# Patient Record
(demographics unavailable — no encounter records)

---

## 2025-07-01 NOTE — REASON FOR VISIT
[Arrhythmia/ECG Abnorrmalities] : arrhythmia/ECG abnormalities [FreeTextEntry3] : Dr. Daniel Mustafa

## 2025-07-01 NOTE — DISCUSSION/SUMMARY
[EKG obtained to assist in diagnosis and management of assessed problem(s)] : EKG obtained to assist in diagnosis and management of assessed problem(s) [FreeTextEntry1] : Impression:  1. Sinus node dysfunction, non-sustained SVT:  EKG performed today to assess for presence of conduction disease, pre-excitation or atrial fibrillation reveals sinus rhythm.  Holter with low heart rate/bradycardia at 32 while awake. The rationale for device placement as well as the procedural risks--including but not limited to pocket hematoma, infection, pneumothorax, pericardial bleed/tamponade, lead dislodgement, and death--were reviewed in detail. After consideration of this information, the decision was made to proceed with cardiac pacemaker placement.  2.  Unprovoked pulmonary embolism, on Xarelto 10 mg   3. HTN; Encourage heart healthy diet, sodium restriction and weight management. Continue regular f/u with Cardiologist for further HTN management. Patient will continue amlodipine 10 mg daily for now.   Schedule pacemaker implantation    Continue f/u with Cardiologist and RTO for f/u for a wound check

## 2025-07-01 NOTE — HISTORY OF PRESENT ILLNESS
[FreeTextEntry1] : Ms. Kristen Sands is a 77-year-old Mandarin speaking woman with a history of HTN, T2DM, hyperlipidemia, sinus node dysfunction, non-sustained SVT., unprovoked pulmonary embolism, on Xarelto 10 mg daily now referred for implantation because the patient's heart rate is 32 while awake. Pt is here for initial evaluation. She is with her daughter who translated Mandarin. She states she has been off the atenolol 25 mg for 3 weeks. She states she has not complained of any symptoms and is able to walk without dyspnea.   Denies chest pain, palpitations, SOB, syncope or near syncope.